# Patient Record
Sex: FEMALE | HISPANIC OR LATINO | Employment: FULL TIME | URBAN - METROPOLITAN AREA
[De-identification: names, ages, dates, MRNs, and addresses within clinical notes are randomized per-mention and may not be internally consistent; named-entity substitution may affect disease eponyms.]

---

## 2021-05-26 ENCOUNTER — RECORDS - HEALTHEAST (OUTPATIENT)
Dept: ADMINISTRATIVE | Facility: CLINIC | Age: 36
End: 2021-05-26

## 2021-05-30 ENCOUNTER — RECORDS - HEALTHEAST (OUTPATIENT)
Dept: ADMINISTRATIVE | Facility: CLINIC | Age: 36
End: 2021-05-30

## 2022-05-23 ENCOUNTER — HOSPITAL ENCOUNTER (EMERGENCY)
Facility: CLINIC | Age: 37
Discharge: HOME | End: 2022-05-23
Attending: EMERGENCY MEDICINE | Admitting: EMERGENCY MEDICINE
Payer: OTHER GOVERNMENT

## 2022-05-23 ENCOUNTER — APPOINTMENT (OUTPATIENT)
Dept: CT IMAGING | Facility: CLINIC | Age: 37
End: 2022-05-23
Attending: EMERGENCY MEDICINE
Payer: OTHER GOVERNMENT

## 2022-05-23 ENCOUNTER — APPOINTMENT (OUTPATIENT)
Dept: RADIOLOGY | Facility: CLINIC | Age: 37
End: 2022-05-23
Attending: EMERGENCY MEDICINE
Payer: OTHER GOVERNMENT

## 2022-05-23 VITALS
SYSTOLIC BLOOD PRESSURE: 148 MMHG | OXYGEN SATURATION: 100 % | DIASTOLIC BLOOD PRESSURE: 96 MMHG | RESPIRATION RATE: 16 BRPM | BODY MASS INDEX: 28.88 KG/M2 | HEART RATE: 76 BPM | HEIGHT: 63 IN | WEIGHT: 163 LBS | TEMPERATURE: 98.3 F

## 2022-05-23 DIAGNOSIS — G44.209 TENSION HEADACHE: ICD-10-CM

## 2022-05-23 DIAGNOSIS — R73.9 HYPERGLYCEMIA: ICD-10-CM

## 2022-05-23 DIAGNOSIS — R10.84 ABDOMINAL PAIN, GENERALIZED: ICD-10-CM

## 2022-05-23 DIAGNOSIS — R11.2 NAUSEA AND VOMITING, INTRACTABILITY OF VOMITING NOT SPECIFIED, UNSPECIFIED VOMITING TYPE: ICD-10-CM

## 2022-05-23 LAB
ALBUMIN SERPL-MCNC: 4.1 G/DL (ref 3.5–5)
ALBUMIN UR-MCNC: NEGATIVE MG/DL
ALP SERPL-CCNC: 96 U/L (ref 45–120)
ALT SERPL W P-5'-P-CCNC: 36 U/L (ref 0–45)
ANION GAP SERPL CALCULATED.3IONS-SCNC: 11 MMOL/L (ref 5–18)
APPEARANCE UR: CLEAR
AST SERPL W P-5'-P-CCNC: 17 U/L (ref 0–40)
BACTERIA #/AREA URNS HPF: ABNORMAL /HPF
BASOPHILS # BLD AUTO: 0 10E3/UL (ref 0–0.2)
BASOPHILS NFR BLD AUTO: 0 %
BILIRUB DIRECT SERPL-MCNC: 0.4 MG/DL
BILIRUB SERPL-MCNC: 1.5 MG/DL (ref 0–1)
BILIRUB UR QL STRIP: NEGATIVE
BUN SERPL-MCNC: 11 MG/DL (ref 8–22)
CALCIUM SERPL-MCNC: 9.9 MG/DL (ref 8.5–10.5)
CHLORIDE BLD-SCNC: 105 MMOL/L (ref 98–107)
CO2 SERPL-SCNC: 24 MMOL/L (ref 22–31)
COLOR UR AUTO: ABNORMAL
CREAT SERPL-MCNC: 0.79 MG/DL (ref 0.6–1.1)
EOSINOPHIL # BLD AUTO: 0.1 10E3/UL (ref 0–0.7)
EOSINOPHIL NFR BLD AUTO: 1 %
ERYTHROCYTE [DISTWIDTH] IN BLOOD BY AUTOMATED COUNT: 13.9 % (ref 10–15)
GFR SERPL CREATININE-BSD FRML MDRD: >90 ML/MIN/1.73M2
GLUCOSE BLD-MCNC: 197 MG/DL (ref 70–125)
GLUCOSE BLDC GLUCOMTR-MCNC: 219 MG/DL (ref 70–99)
GLUCOSE UR STRIP-MCNC: 500 MG/DL
HCT VFR BLD AUTO: 41.7 % (ref 35–47)
HGB BLD-MCNC: 13.8 G/DL (ref 11.7–15.7)
HGB UR QL STRIP: NEGATIVE
HOLD SPECIMEN: NORMAL
HYALINE CASTS: 1 /LPF
IMM GRANULOCYTES # BLD: 0 10E3/UL
IMM GRANULOCYTES NFR BLD: 0 %
KETONES UR STRIP-MCNC: NEGATIVE MG/DL
LEUKOCYTE ESTERASE UR QL STRIP: ABNORMAL
LIPASE SERPL-CCNC: 33 U/L (ref 0–52)
LYMPHOCYTES # BLD AUTO: 3.1 10E3/UL (ref 0.8–5.3)
LYMPHOCYTES NFR BLD AUTO: 34 %
MCH RBC QN AUTO: 30.2 PG (ref 26.5–33)
MCHC RBC AUTO-ENTMCNC: 33.1 G/DL (ref 31.5–36.5)
MCV RBC AUTO: 91 FL (ref 78–100)
MONOCYTES # BLD AUTO: 0.5 10E3/UL (ref 0–1.3)
MONOCYTES NFR BLD AUTO: 6 %
MUCOUS THREADS #/AREA URNS LPF: PRESENT /LPF
NEUTROPHILS # BLD AUTO: 5.2 10E3/UL (ref 1.6–8.3)
NEUTROPHILS NFR BLD AUTO: 59 %
NITRATE UR QL: NEGATIVE
NRBC # BLD AUTO: 0 10E3/UL
NRBC BLD AUTO-RTO: 0 /100
PH UR STRIP: 5.5 [PH] (ref 5–7)
PLATELET # BLD AUTO: 345 10E3/UL (ref 150–450)
POTASSIUM BLD-SCNC: 4 MMOL/L (ref 3.5–5)
PROT SERPL-MCNC: 7.8 G/DL (ref 6–8)
RADIOLOGIST FLAGS: ABNORMAL
RBC # BLD AUTO: 4.57 10E6/UL (ref 3.8–5.2)
RBC URINE: 1 /HPF
SODIUM SERPL-SCNC: 140 MMOL/L (ref 136–145)
SP GR UR STRIP: 1.04 (ref 1–1.03)
SQUAMOUS EPITHELIAL: 5 /HPF
UROBILINOGEN UR STRIP-MCNC: <2 MG/DL
WBC # BLD AUTO: 9 10E3/UL (ref 4–11)
WBC URINE: 3 /HPF

## 2022-05-23 PROCEDURE — 80053 COMPREHEN METABOLIC PANEL: CPT | Performed by: EMERGENCY MEDICINE

## 2022-05-23 PROCEDURE — 258N000003 HC RX IP 258 OP 636: Performed by: EMERGENCY MEDICINE

## 2022-05-23 PROCEDURE — 250N000009 HC RX 250: Performed by: EMERGENCY MEDICINE

## 2022-05-23 PROCEDURE — 99285 EMERGENCY DEPT VISIT HI MDM: CPT | Mod: 25

## 2022-05-23 PROCEDURE — 36415 COLL VENOUS BLD VENIPUNCTURE: CPT | Performed by: EMERGENCY MEDICINE

## 2022-05-23 PROCEDURE — 96374 THER/PROPH/DIAG INJ IV PUSH: CPT | Mod: 59

## 2022-05-23 PROCEDURE — 250N000013 HC RX MED GY IP 250 OP 250 PS 637: Performed by: EMERGENCY MEDICINE

## 2022-05-23 PROCEDURE — 96361 HYDRATE IV INFUSION ADD-ON: CPT

## 2022-05-23 PROCEDURE — 74177 CT ABD & PELVIS W/CONTRAST: CPT

## 2022-05-23 PROCEDURE — 93005 ELECTROCARDIOGRAM TRACING: CPT | Performed by: EMERGENCY MEDICINE

## 2022-05-23 PROCEDURE — 81001 URINALYSIS AUTO W/SCOPE: CPT | Performed by: EMERGENCY MEDICINE

## 2022-05-23 PROCEDURE — 250N000011 HC RX IP 250 OP 636: Performed by: EMERGENCY MEDICINE

## 2022-05-23 PROCEDURE — 82248 BILIRUBIN DIRECT: CPT | Performed by: EMERGENCY MEDICINE

## 2022-05-23 PROCEDURE — 83690 ASSAY OF LIPASE: CPT | Performed by: EMERGENCY MEDICINE

## 2022-05-23 PROCEDURE — 71046 X-RAY EXAM CHEST 2 VIEWS: CPT

## 2022-05-23 PROCEDURE — 85025 COMPLETE CBC W/AUTO DIFF WBC: CPT | Performed by: EMERGENCY MEDICINE

## 2022-05-23 RX ORDER — KETOROLAC TROMETHAMINE 15 MG/ML
15 INJECTION, SOLUTION INTRAMUSCULAR; INTRAVENOUS ONCE
Status: COMPLETED | OUTPATIENT
Start: 2022-05-23 | End: 2022-05-23

## 2022-05-23 RX ORDER — SUCRALFATE ORAL 1 G/10ML
1 SUSPENSION ORAL ONCE
Status: COMPLETED | OUTPATIENT
Start: 2022-05-23 | End: 2022-05-23

## 2022-05-23 RX ORDER — IOPAMIDOL 755 MG/ML
100 INJECTION, SOLUTION INTRAVASCULAR ONCE
Status: COMPLETED | OUTPATIENT
Start: 2022-05-23 | End: 2022-05-23

## 2022-05-23 RX ADMIN — SODIUM CHLORIDE 1000 ML: 9 INJECTION, SOLUTION INTRAVENOUS at 13:33

## 2022-05-23 RX ADMIN — ALUMINUM HYDROXIDE, MAGNESIUM HYDROXIDE, AND SIMETHICONE 30 ML: 200; 200; 20 SUSPENSION ORAL at 14:14

## 2022-05-23 RX ADMIN — SUCRALFATE 1 G: 1 SUSPENSION ORAL at 15:20

## 2022-05-23 RX ADMIN — IOPAMIDOL 100 ML: 755 INJECTION, SOLUTION INTRAVENOUS at 14:59

## 2022-05-23 RX ADMIN — KETOROLAC TROMETHAMINE 15 MG: 15 INJECTION, SOLUTION INTRAMUSCULAR; INTRAVENOUS at 14:02

## 2022-05-23 NOTE — ED TRIAGE NOTES
Pt presents w/ c/o hyperglycemia. Pt reports  PTA. Pt is DM2, BG in triage 219. Pt reports nausea, lightheaded, headache and dizziness. Pt also reports bilateral arm pain that started today. Pt has been taking medications for DM2. ABCs intact.      Triage Assessment     Row Name 05/23/22 1130       Triage Assessment (Adult)    Airway WDL WDL       Respiratory WDL    Respiratory WDL WDL       Skin Circulation/Temperature WDL    Skin Circulation/Temperature WDL WDL       Cardiac WDL    Cardiac WDL WDL       Peripheral/Neurovascular WDL    Peripheral Neurovascular WDL WDL       Cognitive/Neuro/Behavioral WDL    Cognitive/Neuro/Behavioral WDL WDL

## 2022-05-23 NOTE — ED NOTES
EMERGENCY DEPARTMENT SIGN OUT NOTE        ED COURSE AND MEDICAL DECISION MAKING  Patient was signed out to me by Dr Kalyan Lopez at end of shift    In brief, Erica Singleton is a 37 year old female who was evaluated and treated for elevated glucose    At time of sign out, CT of the abdomen was pending    CT of the abdomen did not reveal any acute findings to explain her discomfort however there was a right kidney cyst.  Due to the nature of the cyst cancer is on the differential.  I have clearly advised the patient to follow-up with primary care for an MRI        FINAL IMPRESSION    1. Hyperglycemia    2. Nausea and vomiting, intractability of vomiting not specified, unspecified vomiting type    3. Tension headache    4. Abdominal pain, generalized        ED MEDS  Medications   acetaminophen-caffeine (EXCEDRIN TENSION HEADACHE) 500-65 MG tablet 2 tablet (has no administration in time range)   0.9% sodium chloride BOLUS (0 mLs Intravenous Stopped 5/23/22 1423)   ketorolac (TORADOL) injection 15 mg (15 mg Intravenous Given 5/23/22 1402)   lidocaine (viscous) (XYLOCAINE) 2 % 15 mL, alum & mag hydroxide-simethicone (MAALOX) 15 mL GI Cocktail (30 mLs Oral Given 5/23/22 1414)   sucralfate (CARAFATE) suspension 1 g (1 g Oral Given 5/23/22 1520)   iopamidol (ISOVUE-370) solution 100 mL (100 mLs Intravenous Given 5/23/22 1459)       LAB  Labs Ordered and Resulted from Time of ED Arrival to Time of ED Departure   GLUCOSE BY METER - Abnormal       Result Value    GLUCOSE BY METER POCT 219 (*)    BASIC METABOLIC PANEL - Abnormal    Sodium 140      Potassium 4.0      Chloride 105      Carbon Dioxide (CO2) 24      Anion Gap 11      Urea Nitrogen 11      Creatinine 0.79      Calcium 9.9      Glucose 197 (*)     GFR Estimate >90     HEPATIC FUNCTION PANEL - Abnormal    Bilirubin Total 1.5 (*)     Bilirubin Direct 0.4      Protein Total 7.8      Albumin 4.1      Alkaline Phosphatase 96      AST 17      ALT 36     ROUTINE UA WITH  MICROSCOPIC REFLEX TO CULTURE - Abnormal    Color Urine Light Yellow      Appearance Urine Clear      Glucose Urine 500  (*)     Bilirubin Urine Negative      Ketones Urine Negative      Specific Gravity Urine 1.036 (*)     Blood Urine Negative      pH Urine 5.5      Protein Albumin Urine Negative      Urobilinogen Urine <2.0      Nitrite Urine Negative      Leukocyte Esterase Urine 75 Ken/uL (*)     Bacteria Urine Few (*)     Mucus Urine Present (*)     RBC Urine 1      WBC Urine 3      Squamous Epithelials Urine 5 (*)     Hyaline Casts Urine 1     LIPASE - Normal    Lipase 33     CBC WITH PLATELETS AND DIFFERENTIAL    WBC Count 9.0      RBC Count 4.57      Hemoglobin 13.8      Hematocrit 41.7      MCV 91      MCH 30.2      MCHC 33.1      RDW 13.9      Platelet Count 345      % Neutrophils 59      % Lymphocytes 34      % Monocytes 6      % Eosinophils 1      % Basophils 0      % Immature Granulocytes 0      NRBCs per 100 WBC 0      Absolute Neutrophils 5.2      Absolute Lymphocytes 3.1      Absolute Monocytes 0.5      Absolute Eosinophils 0.1      Absolute Basophils 0.0      Absolute Immature Granulocytes 0.0      Absolute NRBCs 0.0           RADIOLOGY    CT Abdomen Pelvis w Contrast   Final Result   Abnormal   IMPRESSION:    1.  No findings to explain the patient's symptoms. No evidence of an inflammatory process, bowel obstruction or hydronephrosis.   2.  Complex exophytic upper right renal cystic lesion. A cystic renal cell carcinoma is a differential consideration. Recommend nonemergent further evaluation with renal MRI.               [Critical Result: Complex right renal cystic lesion for which further evaluation with MRI is recommended.]      Finding was identified on 5/23/2022 3:17 PM.       Dr. Martinez was contacted by me on 5/23/2022 3:24 PM and verbalized understanding of the critical result.       Chest XR,  PA & LAT   Final Result   IMPRESSION: Negative chest.          DISCHARGE MEDS  New Prescriptions     No medications on file       Kelechi Martinez MD  [facility] EMERGENCY ROOM  [facility] [facility] [address] [phone]     Kelechi Martinez MD  05/23/22 8229

## 2022-05-23 NOTE — DISCHARGE INSTRUCTIONS
You were treated for elevated blood sugar.  CAT scan showed abnormal cyst of the right kidney.  The cyst needs further outpatient evaluation to rule out any serious cause.  I recommended MRI.  Follow-up with your primary care doctor within 1 week to recheck your blood sugar and order outpatient follow-up for the right kidney cyst

## 2022-05-23 NOTE — ED PROVIDER NOTES
"EMERGENCY DEPARTMENT ENCOUNTER      NAME: Erica Singleton  AGE: 37 year old female  YOB: 1985  MRN: [id number]  EVALUATION DATE & TIME: 2022 12:56 PM    PCP: No primary care provider on file.    ED PROVIDER: Kalyan Lopez D.O.      Chief Complaint   Patient presents with     Hyperglycemia       FINAL IMPRESSION:  1. Hyperglycemia    2. Nausea and vomiting, intractability of vomiting not specified, unspecified vomiting type    3. Tension headache    4. Abdominal pain, generalized        ED COURSE & MEDICAL DECISION MAKIN:04 PM I met with the patient to gather history and to perform my initial exam. I discussed the plan for care while in the Emergency Department.  2:29 PM I signed out the patient to Dr. Martinez. Awaiting CT.    ED Course as of 22 0649   Mon May 23, 2022   1411 Reevaluated the patient.  Patient's abdominal pain appears more generalized at this time.  She also reports that she started to get a \"full\" sensation in her chest after the IV fluids were started but does not have any significant shortness of breath.  She also reported some persistent headache, and Toradol has been ordered for this.  GI cocktail has been ordered for the chest discomfort as it does sound like potential GERD.  Additionally will obtain an EKG and a CT scan of the abdomen.   1438 Patient care turned over to Dr. Martinez       Pertinent Labs & Imaging studies reviewed. (See chart for details)  37 year old female presents to the Emergency Department for evaluation of hyperglycemia.  Blood sugar in the emergency department was 197 on the formal lab testing.  Patient additionally did have some mild lightheadedness, and was found to have some abdominal pain on exam.  Lab testing did not show any evidence of DKA, was otherwise unremarkable.  However with the abdominal pain, did decide to obtain a CT scan to ensure there was no other acute pathology present at time of exam in the emergency department.  If CT " imaging is unremarkable I do believe the patient can likely be discharged home with outpatient follow-up with her primary care provider.  Patient care was turned over to Dr. Martinez for final disposition pending results of the CT.    At the conclusion of the encounter I discussed the results of all of the tests and the disposition. The questions were answered. The patient or family acknowledged understanding and was agreeable with the care plan.      HPI    Patient information was obtained from: Patient    Use of : N/A        Erica Singleton is a 37 year old female with a history of T2DM who presents with hyperglycemia.    Patient reports she presents with hyperglycemia, her blood sugar is 220. She endorses nausea and vomiting since Saturday (2 days ago), as well as lightheadedness and a pressure in her head that feels like her head is going to explode. Patient took 3 Zofran on Saturday that did not help. She endorses taking insulin. Endorses chronic neuropathy. Patient denies cough, congestion, chest pain, abdominal pain, shortness of breath, change in urine output, hematuria, dysuria, numbness, tingling, or any other current complaints.    Social: Denies tobacco or alcohol use.    REVIEW OF SYSTEMS  Constitutional:  Denies fever, chills, weight loss or weakness  Eyes:  No pain, discharge, redness  HENT:  Denies sore throat, ear pain, congestion  Respiratory: No SOB, wheeze or cough  Cardiovascular:  No CP, palpitations  GI:  Denies abdominal pain, diarrhea. Positive for nausea and vomiting,  : Denies dysuria, hematuria, change in urine output  Musculoskeletal:  Denies any new muscle/joint pain, swelling or loss of function.  Skin:  Denies rash, pallor  Neurologic:  Denies headache, focal weakness or sensory changes  Lymph: Denies swollen nodes    All other systems negative unless noted in HPI.    PAST MEDICAL HISTORY:  No past medical history on file.    PAST SURGICAL HISTORY:  No past surgical history  "on file.      CURRENT MEDICATIONS:    No current facility-administered medications for this encounter.     Current Outpatient Medications   Medication     Cyanocobalamin (B-12) 1000 MCG TBCR         ALLERGIES:  Allergies   Allergen Reactions     Amoxicillin Anaphylaxis       FAMILY HISTORY:  No family history on file.    SOCIAL HISTORY:  Social History     Socioeconomic History     Marital status:        VITALS:  Patient Vitals for the past 24 hrs:   BP Temp Temp src Pulse Resp SpO2 Height Weight   05/23/22 1129 (!) 146/94 98.3  F (36.8  C) Temporal 75 16 99 % 1.6 m (5' 3\") 73.9 kg (163 lb)       PHYSICAL EXAM    VITAL SIGNS: BP (!) 146/94   Pulse 75   Temp 98.3  F (36.8  C) (Temporal)   Resp 16   Ht 1.6 m (5' 3\")   Wt 73.9 kg (163 lb)   LMP 04/01/2022 (Within Months)   SpO2 99%   BMI 28.87 kg/m      General Appearance: Well-appearing, well-nourished, no acute distress   Head:  Normocephalic, without obvious abnormality, atraumatic  Eyes:  PERRL, conjunctiva/corneas clear, EOM's intact,  ENT:  Lips, mucosa, and tongue normal, membranes are moist without pallor  Neck:  Normal ROM, symmetrical, trachea midline    Cardio:  Regular rate and rhythm, no murmur, rub or gallop, 2+ pulses symmetric in all extremities  Pulm:  Clear to auscultation bilaterally, respirations unlabored,  Abdomen:  Soft, no rebound or guarding. Mild LLQ tenderness with palpation.  Musculoskeletal: Full ROM, no edema, no cyanosis, good ROM of major joints  Integument:  Warm, Dry, No erythema, No rash.    Neurologic:  Alert & oriented.  No focal deficits appreciated.  Ambulatory.  Psychiatric:  Affect normal, Judgment normal, Mood normal.      LABS  Results for orders placed or performed during the hospital encounter of 05/23/22 (from the past 24 hour(s))   Glucose by meter   Result Value Ref Range    GLUCOSE BY METER POCT 219 (H) 70 - 99 mg/dL   CBC with platelets + differential    Narrative    The following orders were created for " panel order CBC with platelets + differential.  Procedure                               Abnormality         Status                     ---------                               -----------         ------                     CBC with platelets and d...[[id number]]                      Final result                 Please view results for these tests on the individual orders.   Basic metabolic panel   Result Value Ref Range    Sodium 140 136 - 145 mmol/L    Potassium 4.0 3.5 - 5.0 mmol/L    Chloride 105 98 - 107 mmol/L    Carbon Dioxide (CO2) 24 22 - 31 mmol/L    Anion Gap 11 5 - 18 mmol/L    Urea Nitrogen 11 8 - 22 mg/dL    Creatinine 0.79 0.60 - 1.10 mg/dL    Calcium 9.9 8.5 - 10.5 mg/dL    Glucose 197 (H) 70 - 125 mg/dL    GFR Estimate >90 >60 mL/min/1.73m2   Hepatic function panel   Result Value Ref Range    Bilirubin Total 1.5 (H) 0.0 - 1.0 mg/dL    Bilirubin Direct 0.4 <=0.5 mg/dL    Protein Total 7.8 6.0 - 8.0 g/dL    Albumin 4.1 3.5 - 5.0 g/dL    Alkaline Phosphatase 96 45 - 120 U/L    AST 17 0 - 40 U/L    ALT 36 0 - 45 U/L   Lipase   Result Value Ref Range    Lipase 33 0 - 52 U/L   [place] Draw    Narrative    The following orders were created for panel order [place] Draw.  Procedure                               Abnormality         Status                     ---------                               -----------         ------                     Extra Red Top Tube[[id number]]                               In process                 Extra Green Top (Lithium...[[id number]]                      In process                 Extra Purple Top Tube[[id number]]                            In process                   Please view results for these tests on the individual orders.   CBC with platelets and differential   Result Value Ref Range    WBC Count 9.0 4.0 - 11.0 10e3/uL    RBC Count 4.57 3.80 - 5.20 10e6/uL    Hemoglobin 13.8 11.7 - 15.7 g/dL    Hematocrit 41.7 35.0 - 47.0 %    MCV 91 78 - 100 fL    MCH 30.2 26.5 - 33.0 pg     MCHC 33.1 31.5 - 36.5 g/dL    RDW 13.9 10.0 - 15.0 %    Platelet Count 345 150 - 450 10e3/uL    % Neutrophils 59 %    % Lymphocytes 34 %    % Monocytes 6 %    % Eosinophils 1 %    % Basophils 0 %    % Immature Granulocytes 0 %    NRBCs per 100 WBC 0 <1 /100    Absolute Neutrophils 5.2 1.6 - 8.3 10e3/uL    Absolute Lymphocytes 3.1 0.8 - 5.3 10e3/uL    Absolute Monocytes 0.5 0.0 - 1.3 10e3/uL    Absolute Eosinophils 0.1 0.0 - 0.7 10e3/uL    Absolute Basophils 0.0 0.0 - 0.2 10e3/uL    Absolute Immature Granulocytes 0.0 <=0.4 10e3/uL    Absolute NRBCs 0.0 10e3/uL         RADIOLOGY  Chest XR,  PA & LAT    (Results Pending)   CT Abdomen Pelvis w Contrast    (Results Pending)          EKG:    Rate: 71bpm  Rhythm: Normal Sinus Rhythm  Axis: Normal  Interval: Normal  Conduction: Normal  QRS: Normal  ST: Normal  T-wave: Normal  QT: Not prolonged  Comparison EKG: no previous available for comparison  Impression:  No acute ischemic change   I have independently reviewed and interpreted today's EKG, pending Cardiologist read        MEDICATIONS GIVEN IN THE EMERGENCY:  Medications   0.9% sodium chloride BOLUS (0 mLs Intravenous Stopped 5/23/22 1423)   ketorolac (TORADOL) injection 15 mg (15 mg Intravenous Given 5/23/22 1402)   lidocaine (viscous) (XYLOCAINE) 2 % 15 mL, alum & mag hydroxide-simethicone (MAALOX) 15 mL GI Cocktail (30 mLs Oral Given 5/23/22 1414)       NEW PRESCRIPTIONS STARTED AT TODAY'S ER VISIT  New Prescriptions    No medications on file        Kalyan Lopez D.O.  Emergency Medicine  [facility] EMERGENCY ROOM  [address] [address] [phone]  Dept: [phone]     Kalyan Lopez DO  05/24/22 9164

## 2022-06-02 ENCOUNTER — OFFICE VISIT (OUTPATIENT)
Dept: FAMILY MEDICINE | Facility: CLINIC | Age: 37
End: 2022-06-02
Payer: OTHER GOVERNMENT

## 2022-06-02 VITALS
HEART RATE: 65 BPM | SYSTOLIC BLOOD PRESSURE: 126 MMHG | WEIGHT: 165.4 LBS | BODY MASS INDEX: 29.3 KG/M2 | DIASTOLIC BLOOD PRESSURE: 86 MMHG | HEIGHT: 63 IN | OXYGEN SATURATION: 98 % | RESPIRATION RATE: 16 BRPM

## 2022-06-02 DIAGNOSIS — N28.1 COMPLEX RENAL CYST: ICD-10-CM

## 2022-06-02 DIAGNOSIS — R11.0 NAUSEA: ICD-10-CM

## 2022-06-02 DIAGNOSIS — E11.65 TYPE 2 DIABETES MELLITUS WITH HYPERGLYCEMIA, WITHOUT LONG-TERM CURRENT USE OF INSULIN (H): Primary | ICD-10-CM

## 2022-06-02 PROBLEM — E53.8 VITAMIN B12 DEFICIENCY: Status: ACTIVE | Noted: 2021-12-09

## 2022-06-02 PROBLEM — E66.811 OBESITY (BMI 30.0-34.9): Status: RESOLVED | Noted: 2019-02-27 | Resolved: 2022-06-02

## 2022-06-02 PROBLEM — R05.9 COUGH: Status: ACTIVE | Noted: 2021-07-19

## 2022-06-02 PROBLEM — G89.29 CHRONIC RIGHT SHOULDER PAIN: Status: ACTIVE | Noted: 2021-07-19

## 2022-06-02 PROBLEM — M25.511 CHRONIC RIGHT SHOULDER PAIN: Status: ACTIVE | Noted: 2021-07-19

## 2022-06-02 PROBLEM — E78.5 DYSLIPIDEMIA: Status: ACTIVE | Noted: 2021-06-11

## 2022-06-02 PROBLEM — E66.811 OBESITY (BMI 30.0-34.9): Status: ACTIVE | Noted: 2019-02-27

## 2022-06-02 PROCEDURE — 99204 OFFICE O/P NEW MOD 45 MIN: CPT | Performed by: FAMILY MEDICINE

## 2022-06-02 RX ORDER — SEMAGLUTIDE 1.34 MG/ML
INJECTION, SOLUTION SUBCUTANEOUS
Status: CANCELLED | OUTPATIENT
Start: 2022-06-02

## 2022-06-02 RX ORDER — SEMAGLUTIDE 1.34 MG/ML
1 INJECTION, SOLUTION SUBCUTANEOUS WEEKLY
Qty: 9 ML | Refills: 3 | Status: SHIPPED | OUTPATIENT
Start: 2022-06-02

## 2022-06-02 RX ORDER — ALBUTEROL SULFATE 90 UG/1
AEROSOL, METERED RESPIRATORY (INHALATION)
COMMUNITY
Start: 2022-04-07

## 2022-06-02 RX ORDER — ONDANSETRON 4 MG/1
TABLET, ORALLY DISINTEGRATING ORAL
COMMUNITY
Start: 2021-06-09

## 2022-06-02 RX ORDER — SEMAGLUTIDE 1.34 MG/ML
INJECTION, SOLUTION SUBCUTANEOUS
COMMUNITY
Start: 2022-04-20

## 2022-06-02 RX ORDER — BLOOD-GLUCOSE METER
KIT MISCELLANEOUS
COMMUNITY
Start: 2021-12-09

## 2022-06-02 NOTE — PROGRESS NOTES
"  Assessment & Plan     (E11.65) Type 2 diabetes mellitus with hyperglycemia, without long-term current use of insulin (H)  (primary encounter diagnosis)  Comment: a1c 7.0 at 12/2021. DM fair control. Will continue current plan . She will need a1c and urine microalbumine test. She likes to do the lab at next visit.   Plan: Semaglutide, 1 MG/DOSE, (OZEMPIC, 1 MG/DOSE,) 4        MG/3ML SOPN         Continue monitor bs at home. DM diet control and exercise addressed.     (R11.0) Nausea  Comment: improved some and zofran not much help. She is not pregnant. Unknown etiology. She had normal liver and kidney function test at er.    Plan: advise brat diet as tolerated. Rest and push fluid.     (N28.1) Complex renal cyst  Comment: accidentally finding per ct. No family history of kidney disease. Non smoker.  Kidney function is normal. No blood in urine.   Plan: MR Renal wo & w Contrast, Adult Urology         Referral        Will have renal mri and consult with urology.         BMI:   Estimated body mass index is 29.3 kg/m  as calculated from the following:    Height as of this encounter: 1.6 m (5' 3\").    Weight as of this encounter: 75 kg (165 lb 6.4 oz).    Return in about 2 weeks (around 6/16/2022).    Nancy Navarrete MD  [facility]    Subjective   April is a 37 year old who presents for the following health issues  accompanied by her friend.    HPI     1) pt was at er at 5/23 due to hyperglycemia and n/v. Reviewed the chart : abd ct found: Complex exophytic upper right renal cystic lesion. A cystic renal cell carcinoma is a differential consideration. Recommend nonemergent further evaluation with renal MRI.    2) DM she uses semaglutide 1 mg weekly.     3) nausea for one month. She has implant for bc, she does not get period.       Review of Systems   Constitutional, HEENT, cardiovascular, pulmonary, gi and gu systems are negative, except as otherwise noted.      Objective    /86 (BP " "Location: Right arm, Patient Position: Sitting, Cuff Size: Adult Regular)   Pulse 65   Resp 16   Ht 1.6 m (5' 3\")   Wt 75 kg (165 lb 6.4 oz)   SpO2 98%   BMI 29.30 kg/m    Body mass index is 29.3 kg/m .  Physical Exam   GENERAL: healthy, alert and no distress  NECK: no adenopathy, no asymmetry, masses, or scars and thyroid normal to palpation  RESP: lungs clear to auscultation - no rales, rhonchi or wheezes  CV: regular rate and rhythm, normal S1 S2, no S3 or S4, no murmur, click or rub, no peripheral edema and peripheral pulses strong  ABDOMEN: soft,  Mild tender at low abd, no hepatosplenomegaly, no masses and bowel sounds normal. Moderate tenderness on right CVA.   MS: no gross musculoskeletal defects noted, no edema     "

## 2022-06-08 ENCOUNTER — MYC MEDICAL ADVICE (OUTPATIENT)
Dept: FAMILY MEDICINE | Facility: CLINIC | Age: 37
End: 2022-06-08
Payer: OTHER GOVERNMENT

## 2022-06-08 DIAGNOSIS — N28.1 RENAL CYST: Primary | ICD-10-CM

## 2022-06-15 ENCOUNTER — HOSPITAL ENCOUNTER (OUTPATIENT)
Dept: MRI IMAGING | Facility: CLINIC | Age: 37
Discharge: HOME | End: 2022-06-15
Attending: FAMILY MEDICINE | Admitting: FAMILY MEDICINE
Payer: OTHER GOVERNMENT

## 2022-06-15 DIAGNOSIS — N28.1 COMPLEX RENAL CYST: ICD-10-CM

## 2022-06-15 PROCEDURE — A9585 GADOBUTROL INJECTION: HCPCS | Performed by: FAMILY MEDICINE

## 2022-06-15 PROCEDURE — 74183 MRI ABD W/O CNTR FLWD CNTR: CPT

## 2022-06-15 PROCEDURE — 255N000002 HC RX 255 OP 636: Performed by: FAMILY MEDICINE

## 2022-06-15 RX ORDER — GADOBUTROL 604.72 MG/ML
7 INJECTION INTRAVENOUS ONCE
Status: COMPLETED | OUTPATIENT
Start: 2022-06-15 | End: 2022-06-15

## 2022-06-15 RX ADMIN — GADOBUTROL 7 ML: 604.72 INJECTION INTRAVENOUS at 17:54

## 2022-06-16 ENCOUNTER — TELEPHONE (OUTPATIENT)
Dept: FAMILY MEDICINE | Facility: CLINIC | Age: 37
End: 2022-06-16
Payer: OTHER GOVERNMENT

## 2022-06-16 NOTE — TELEPHONE ENCOUNTER
Called pt and informed the renal mri result and she needs to follow-up with urologist. She  has appointment with urologist Dr. Gordo Rosado on 6/28/2022.      Nancy Navarrete MD on 6/16/2022 at 9:09 AM

## 2022-06-24 ENCOUNTER — HOSPITAL ENCOUNTER (EMERGENCY)
Facility: CLINIC | Age: 37
Discharge: HOME | End: 2022-06-24
Admitting: PHYSICIAN ASSISTANT
Payer: OTHER GOVERNMENT

## 2022-06-24 VITALS
HEIGHT: 63 IN | RESPIRATION RATE: 16 BRPM | SYSTOLIC BLOOD PRESSURE: 143 MMHG | BODY MASS INDEX: 28.88 KG/M2 | HEART RATE: 79 BPM | TEMPERATURE: 98.3 F | OXYGEN SATURATION: 98 % | DIASTOLIC BLOOD PRESSURE: 83 MMHG | WEIGHT: 163 LBS

## 2022-06-24 DIAGNOSIS — M54.9 BACK PAIN: ICD-10-CM

## 2022-06-24 DIAGNOSIS — C64.1 RENAL CELL CARCINOMA OF RIGHT KIDNEY METASTATIC TO OTHER SITE (H): ICD-10-CM

## 2022-06-24 PROCEDURE — 99285 EMERGENCY DEPT VISIT HI MDM: CPT

## 2022-06-24 PROCEDURE — 250N000011 HC RX IP 250 OP 636: Performed by: PHYSICIAN ASSISTANT

## 2022-06-24 PROCEDURE — 96372 THER/PROPH/DIAG INJ SC/IM: CPT | Performed by: PHYSICIAN ASSISTANT

## 2022-06-24 RX ORDER — MORPHINE SULFATE 10 MG/ML
8 INJECTION, SOLUTION INTRAMUSCULAR; INTRAVENOUS ONCE
Status: COMPLETED | OUTPATIENT
Start: 2022-06-24 | End: 2022-06-24

## 2022-06-24 RX ORDER — METOCLOPRAMIDE 5 MG/1
5 TABLET ORAL
Qty: 20 TABLET | Refills: 0 | Status: SHIPPED | OUTPATIENT
Start: 2022-06-24

## 2022-06-24 RX ORDER — OXYCODONE AND ACETAMINOPHEN 5; 325 MG/1; MG/1
1 TABLET ORAL EVERY 6 HOURS PRN
Qty: 12 TABLET | Refills: 0 | Status: SHIPPED | OUTPATIENT
Start: 2022-06-24

## 2022-06-24 RX ORDER — ONDANSETRON 4 MG/1
4 TABLET, ORALLY DISINTEGRATING ORAL ONCE
Status: COMPLETED | OUTPATIENT
Start: 2022-06-24 | End: 2022-06-24

## 2022-06-24 RX ORDER — MORPHINE SULFATE 4 MG/ML
8 INJECTION, SOLUTION INTRAMUSCULAR; INTRAVENOUS ONCE
Status: DISCONTINUED | OUTPATIENT
Start: 2022-06-24 | End: 2022-06-24

## 2022-06-24 RX ADMIN — MORPHINE SULFATE 8 MG: 10 INJECTION INTRAVENOUS at 17:57

## 2022-06-24 RX ADMIN — ONDANSETRON 4 MG: 4 TABLET, ORALLY DISINTEGRATING ORAL at 17:57

## 2022-06-24 ASSESSMENT — ENCOUNTER SYMPTOMS
NAUSEA: 1
FREQUENCY: 0
DYSURIA: 0
BACK PAIN: 1
WEAKNESS: 0
FEVER: 0
NUMBNESS: 0
CHILLS: 0
VOMITING: 1

## 2022-06-24 NOTE — ED TRIAGE NOTES
Arrives to ED with c/o back pain. Has mass to R kidney. Biopsy dx with renal cell carcinoma. Has tylenol #3 at home. Only able to take at HS d/t side effect of N/V. Reports has been alternating 1000mg tylenol and ibuprofen without relief. Pain 10/10 to entire back.      Triage Assessment     Row Name 06/24/22 2994       Triage Assessment (Adult)    Airway WDL WDL       Respiratory WDL    Respiratory WDL WDL       Skin Circulation/Temperature WDL    Skin Circulation/Temperature WDL WDL       Cardiac WDL    Cardiac WDL WDL       Peripheral/Neurovascular WDL    Peripheral Neurovascular WDL WDL       Cognitive/Neuro/Behavioral WDL    Cognitive/Neuro/Behavioral WDL WDL

## 2022-06-24 NOTE — Clinical Note
Erica Singleton was seen and treated in our emergency department on 6/24/2022.  She may return to work on 06/25/2022.       If you have any questions or concerns, please don't hesitate to call.      Nalini Das PA-C

## 2022-06-24 NOTE — DISCHARGE INSTRUCTIONS
As we discussed, please follow-up with urology as scheduled on Tuesday.  You can have a further discussion about work-up as well as pain control at that appointment.  In the meantime, I will send you home with a prescription for Percocet which is oxycodone with Tylenol.  This is a strong pain medication and can make you drowsy.  Please do not take this while working, driving, or operating heavy machinery.  You may not take this with the Tylenol 3 that you have at home.  I will also send you home with a prescription for Reglan for your nausea you can try instead of Zofran.  If at anytime you develop any new or worsening pain, numbness in your groin, loss of control of bladder or bowel, weakness or numbness in your legs, or any new or concerning symptoms please return to the ER for further evaluation.

## 2022-06-24 NOTE — ED PROVIDER NOTES
Emergency Department Encounter   NAME: Erica Singleton ; AGE: 37 year old female ; YOB: 1985 ; MRN: [id number] ; PCP: System, Provider Not In   ED PROVIDER: Nalini Das PA-C    Evaluation Date & Time:   6/24/2022  4:37 PM    CHIEF COMPLAINT:  Back Pain      Impression and Plan   MDM: Erica Singleton is a 37 year old female with a pertinent history of dyslipidemia, GERD, type 2 diabetes, vitamin B12 deficiency, who presents to the ED by private vehicle for evaluation of pain.  The patient has had ongoing pain to her low back over the past 3 weeks, during which she has had an outpatient work-up and diagnosed with renal cell carcinoma by an MRI through her primary care clinic.  She will finally be able to see urology at her scheduled appointment on Tuesday (in 4 days).  She has a prescription for Tylenol with codeine at home, however it does not help her pain and makes her nauseated despite using Zofran.  She presented to the ER today for pain control.  Unfortunately, patient had a very long wait in triage until she was able to go to her room for evaluation.  She is vitally stable, well-appearing, and in no acute distress.  She does have some mild tenderness to the abdomen that is generalized as well as diffuse tenderness along her lower back including her bilateral CVAs, paraspinal musculature, and midline spine which I suspect is due to her known metastatic cancer.  Tenderness is uniform across the back and there is no localized areas of increased tenderness.  No palpable bony step-offs of the spine or neurologic deficits.  Low suspicion for metastatic spinal lesion, no evidence of cauda equina syndrome.  Offered patient CT of her spine to further evaluate, though she ultimately declined.  Feel this is reasonable as patient will likely have further work-up and PET scan with urology in 4 days.  No fevers or infectious symptoms.  No concern at this time for UTI or pyelonephritis.  Her symptoms are  consistent with ongoing cancer pain and she would like something stronger to treat this.  I ordered IM morphine and Zofran for her pain here in the ER, we will change her Tylenol 3 to Percocet at home and I will provide her with a prescription for Reglan.  We reviewed medication side effects, importance of following up with urology as scheduled, and concerning signs and symptoms to return to the ED.  She verbalized understanding is comfortable with the plan.  Discharged home in good condition.    ED COURSE:  5:22 PM I met and introduced myself to the patient. I gathered initial history and performed my physical exam.  We discussed prescriptions, discharge, follow-up, and reasons to return to the emergency department.      At the conclusion of the encounter I discussed the results of all the tests and the disposition. The questions were answered. The patient or family acknowledged understanding and was agreeable with the care plan.    FINAL IMPRESSION:    ICD-10-CM    1. Renal cell carcinoma of right kidney metastatic to other site (H)  C64.1    2. Back pain  M54.9          MEDICATIONS GIVEN IN THE EMERGENCY DEPARTMENT:  Medications   ondansetron (ZOFRAN ODT) ODT tab 4 mg (4 mg Oral Given 6/24/22 1757)   Morphine Sulfate (PF) injection 8 mg (8 mg Intramuscular Given 6/24/22 1757)         NEW PRESCRIPTIONS STARTED AT TODAY'S ED VISIT:  New Prescriptions    METOCLOPRAMIDE (REGLAN) 5 MG TABLET    Take 1 tablet (5 mg) by mouth 4 times daily (before meals and nightly)    OXYCODONE-ACETAMINOPHEN (PERCOCET) 5-325 MG TABLET    Take 1 tablet by mouth every 6 hours as needed for pain         HPI   Patient information was obtained from: Patient    Use of Intrepreter: N/A    Erica Singleton is a 37 year old female with a pertinent history of dyslipidemia, GERD, type 2 diabetes, vitamin B12 deficiency, who presents to the ED by private vehicle for evaluation of pain.     Patient was seen in our emergency department at the end of  May at which time she was found to have an incidental mass on her right kidney.  She followed up with her primary care provider and was referred for an MRI, and she reports that the mass on her right kidney was consistent with renal cell carcinoma that there was also a spot on her left kidney and on her pancreas.  She has scheduled follow-up with urology on Tuesday (~4 days).  When she had her MRI performed, she received a prescription for Tylenol with Codeine, and has been using them though with minimal relief of her pain.  They are also causing her to feel quite nauseated and she has had several episodes of vomiting.  She does have a prescription for Zofran at home though this is not really improving her nausea.  Her pain is across her entire lower back.  She has not had any other associated symptoms.  No fevers or chills, no urinary symptoms, no numbness in her groin, loss of control of bladder or bowel, weakness or numbness in her legs.  She is here today for pain control until she sees urology.    REVIEW OF SYSTEMS:  Review of Systems   Constitutional: Negative for chills and fever.   Gastrointestinal: Positive for nausea and vomiting (pain medicine induced).   Genitourinary: Negative for dysuria, frequency and urgency.   Musculoskeletal: Positive for back pain (ongoing).   Neurological: Negative for weakness and numbness.        No loss of bladder or bowel control.   All other systems reviewed and are negative.        Medical History     No past medical history on file.    No past surgical history on file.    No family history on file.    Social History     Tobacco Use     Smoking status: Never Smoker     Smokeless tobacco: Never Used   Substance Use Topics     Alcohol use: Not Currently     Drug use: Never       metoclopramide (REGLAN) 5 MG tablet  oxyCODONE-acetaminophen (PERCOCET) 5-325 MG tablet  acetaminophen-codeine (TYLENOL #3) 300-30 MG tablet  albuterol (PROAIR HFA/PROVENTIL HFA/VENTOLIN HFA) 108 (90  "Base) MCG/ACT inhaler  blood glucose (FREESTYLE LITE) test strip  Cyanocobalamin (B-12) 1000 MCG TBCR  ondansetron (ZOFRAN ODT) 4 MG ODT tab  OZEMPIC, 1 MG/DOSE, 4 MG/3ML SOPN  Semaglutide, 1 MG/DOSE, (OZEMPIC, 1 MG/DOSE,) 4 MG/3ML SOPN          Physical Exam     First Vitals:  Patient Vitals for the past 24 hrs:   BP Temp Temp src Pulse Resp SpO2 Height Weight   06/24/22 1812 (!) 143/83 -- -- 79 16 98 % -- --   06/24/22 1337 (!) 145/90 98.3  F (36.8  C) Temporal 79 16 99 % 1.6 m (5' 3\") 73.9 kg (163 lb)         PHYSICAL EXAM:   Physical Exam  Vitals and nursing note reviewed.   Constitutional:       General: She is not in acute distress.     Appearance: Normal appearance. She is not ill-appearing or toxic-appearing.   HENT:      Head: Normocephalic and atraumatic.   Eyes:      Conjunctiva/sclera: Conjunctivae normal.   Cardiovascular:      Rate and Rhythm: Normal rate and regular rhythm.      Heart sounds: Normal heart sounds.   Pulmonary:      Effort: Pulmonary effort is normal.      Breath sounds: Normal breath sounds.   Abdominal:      General: Abdomen is flat. Bowel sounds are normal. There is no distension.      Palpations: Abdomen is soft.      Tenderness: There is abdominal tenderness (mild). There is right CVA tenderness and left CVA tenderness. There is no guarding or rebound.   Musculoskeletal:      Comments: Tenderness across her entire lower back, midline, flank, and paraspinal musculature. No bony step offs. No rashes or lesions. Gait intact.  5-5 strength with dorsiflexion and plantarflexion, knee flexion extension, hip flexion.  Sensation intact to extremities.    Neurological:      Mental Status: She is alert.         Results     LAB:  All pertinent labs reviewed and interpreted  Labs Ordered and Resulted from Time of ED Arrival to Time of ED Departure - No data to display    RADIOLOGY:  No orders to display       Nalini Das PA-C   Emergency Medicine   [facility] " EMERGENCY ROOM       Nalini Das PA-C  06/24/22 1434

## 2022-06-28 ENCOUNTER — MYC MEDICAL ADVICE (OUTPATIENT)
Dept: FAMILY MEDICINE | Facility: CLINIC | Age: 37
End: 2022-06-28

## 2022-06-28 ENCOUNTER — OFFICE VISIT (OUTPATIENT)
Dept: ONCOLOGY | Facility: CLINIC | Age: 37
End: 2022-06-28
Attending: STUDENT IN AN ORGANIZED HEALTH CARE EDUCATION/TRAINING PROGRAM
Payer: OTHER GOVERNMENT

## 2022-06-28 VITALS
RESPIRATION RATE: 18 BRPM | OXYGEN SATURATION: 99 % | TEMPERATURE: 98.5 F | HEART RATE: 70 BPM | SYSTOLIC BLOOD PRESSURE: 156 MMHG | HEIGHT: 63 IN | WEIGHT: 165.3 LBS | DIASTOLIC BLOOD PRESSURE: 95 MMHG | BODY MASS INDEX: 29.29 KG/M2

## 2022-06-28 DIAGNOSIS — C64.1 RENAL MALIGNANT TUMOR, RIGHT (H): Primary | ICD-10-CM

## 2022-06-28 DIAGNOSIS — E11.65 TYPE 2 DIABETES MELLITUS WITH HYPERGLYCEMIA, WITHOUT LONG-TERM CURRENT USE OF INSULIN (H): ICD-10-CM

## 2022-06-28 PROCEDURE — 99204 OFFICE O/P NEW MOD 45 MIN: CPT | Performed by: STUDENT IN AN ORGANIZED HEALTH CARE EDUCATION/TRAINING PROGRAM

## 2022-06-28 PROCEDURE — G0463 HOSPITAL OUTPT CLINIC VISIT: HCPCS

## 2022-06-28 RX ORDER — CLINDAMYCIN PHOSPHATE 900 MG/50ML
900 INJECTION, SOLUTION INTRAVENOUS
Status: CANCELLED | OUTPATIENT
Start: 2022-06-28

## 2022-06-28 RX ORDER — KETOROLAC TROMETHAMINE 10 MG/1
10 TABLET, FILM COATED ORAL EVERY 6 HOURS PRN
Qty: 20 TABLET | Refills: 0 | Status: SHIPPED | OUTPATIENT
Start: 2022-06-28

## 2022-06-28 RX ORDER — CLINDAMYCIN PHOSPHATE 900 MG/50ML
900 INJECTION, SOLUTION INTRAVENOUS SEE ADMIN INSTRUCTIONS
Status: CANCELLED | OUTPATIENT
Start: 2022-06-28

## 2022-06-28 RX ORDER — HEPARIN SODIUM 5000 [USP'U]/.5ML
5000 INJECTION, SOLUTION INTRAVENOUS; SUBCUTANEOUS
Status: CANCELLED | OUTPATIENT
Start: 2022-06-28

## 2022-06-28 ASSESSMENT — PAIN SCALES - GENERAL: PAINLEVEL: EXTREME PAIN (8)

## 2022-06-28 NOTE — PROGRESS NOTES
"Oncology Rooming Note    June 28, 2022 12:52 PM   Erica Singleton is a 37 year old female who presents for:    Chief Complaint   Patient presents with     Urology     Initial Vitals: BP (!) 156/95   Pulse 70   Temp 98.5  F (36.9  C)   Resp 18   Ht 1.6 m (5' 3\")   Wt 75 kg (165 lb 4.8 oz)   SpO2 99%   BMI 29.28 kg/m   Estimated body mass index is 29.28 kg/m  as calculated from the following:    Height as of this encounter: 1.6 m (5' 3\").    Weight as of this encounter: 75 kg (165 lb 4.8 oz). Body surface area is 1.83 meters squared.  Extreme Pain (8) Comment: Data Unavailable   No LMP recorded. Patient has had an implant.  Allergies reviewed: Yes  Medications reviewed: Yes    Medications: Medication refills not needed today.  Pharmacy name entered into [employer]: [id number] [place] [address] AT [facility]    Clinical concerns: None       Kari Uribe LPN            "

## 2022-06-28 NOTE — LETTER
"    6/28/2022         RE: Erica Singleton  [address] [facility] [address]        Dear Colleague,    Thank you for referring your patient, Erica Singleton, to the [facility]. Please see a copy of my visit note below.    Oncology Rooming Note    June 28, 2022 12:52 PM   Erica Singleton is a 37 year old female who presents for:    Chief Complaint   Patient presents with     Urology     Initial Vitals: BP (!) 156/95   Pulse 70   Temp 98.5  F (36.9  C)   Resp 18   Ht 1.6 m (5' 3\")   Wt 75 kg (165 lb 4.8 oz)   SpO2 99%   BMI 29.28 kg/m   Estimated body mass index is 29.28 kg/m  as calculated from the following:    Height as of this encounter: 1.6 m (5' 3\").    Weight as of this encounter: 75 kg (165 lb 4.8 oz). Body surface area is 1.83 meters squared.  Extreme Pain (8) Comment: Data Unavailable   No LMP recorded. Patient has had an implant.  Allergies reviewed: Yes  Medications reviewed: Yes    Medications: Medication refills not needed today.  Pharmacy name entered into [facility]: [id number] [address] AT [address]    Clinical concerns: None       Kari Uribe LPN                    Chief Complaint:    Renal Mass           Consult or Referral:     Mr. Erica Singleton is a 37 year old female seen at the request of Dr. Navarrete.         History of Present Illness:     Erica Singelton is a 37 year old female being seen for right renal mass.  Duration of problem: Incidentally discovered while being worked up for abdominal pain  Previous treatments: Diabetic type II     accompanied by her mother.  Reviewed previous notes from Dr. Navarrete    April was recently seen in the ED with abdominal pain upon evaluation was found to have a right renal mass which was cystic in consistency  She follow-up with an MRI which confirmed the presence of a cystic renal mass with nodular components  She has vague abdominal pain not localized " to the right flank region  There is no history of hematuria  She is a diabetic type II not on insulin             Past Medical History:   No past medical history on file.         Past Surgical History:   No past surgical history on file.         Medications     Current Outpatient Medications   Medication     acetaminophen-codeine (TYLENOL #3) 300-30 MG tablet     albuterol (PROAIR HFA/PROVENTIL HFA/VENTOLIN HFA) 108 (90 Base) MCG/ACT inhaler     blood glucose (FREESTYLE LITE) test strip     Cyanocobalamin (B-12) 1000 MCG TBCR     ketorolac (TORADOL) 10 MG tablet     metoclopramide (REGLAN) 5 MG tablet     oxyCODONE-acetaminophen (PERCOCET) 5-325 MG tablet     OZEMPIC, 1 MG/DOSE, 4 MG/3ML SOPN     Semaglutide, 1 MG/DOSE, (OZEMPIC, 1 MG/DOSE,) 4 MG/3ML SOPN     ondansetron (ZOFRAN ODT) 4 MG ODT tab     No current facility-administered medications for this visit.            Family History:   No family history on file.         Social History:     Social History     Socioeconomic History     Marital status:      Spouse name: Not on file     Number of children: Not on file     Years of education: Not on file     Highest education level: Not on file   Occupational History     Not on file   Tobacco Use     Smoking status: Never Smoker     Smokeless tobacco: Never Used   Substance and Sexual Activity     Alcohol use: Not Currently     Drug use: Never     Sexual activity: Not on file   Other Topics Concern     Not on file   Social History Narrative     Not on file     Social Determinants of Health     Financial Resource Strain: Not on file   Food Insecurity: Not on file   Transportation Needs: Not on file   Physical Activity: Not on file   Stress: Not on file   Social Connections: Not on file   Intimate Partner Violence: Not on file   Housing Stability: Not on file            Allergies:   Amoxicillin         Review of Systems:  From intake questionnaire     Skin: negative  Eyes: negative  Ears/Nose/Throat:  "negative  Respiratory: No shortness of breath, dyspnea on exertion, cough, or hemoptysis  Cardiovascular: No chest pain or palpitations  Gastrointestinal: negative; no nausea/vomiting, constipation or diarrhea  Genitourinary: as per HPI  Musculoskeletal: negative  Neurologic: negative  Psychiatric: negative  Hematologic/Lymphatic/Immunologic: negative  Endocrine: negative         Physical Exam:     Patient is a 37 year old  female   Vitals: Blood pressure (!) 156/95, pulse 70, temperature 98.5  F (36.9  C), resp. rate 18, height 1.6 m (5' 3\"), weight 75 kg (165 lb 4.8 oz), SpO2 99 %.  Constitutional: Body mass index is 29.28 kg/m .  Alert, no acute distress, oriented, conversant  Eyes: no scleral icterus; extraocular muscles intact, moist conjunctivae  Neck: trachea midline, no thyromegaly  Ears/nose/mouth: throat/mouth:normal, good dentition  Respiratory: no respiratory distress, or pursed lip breathing  Cardiovascular: pulses strong and intact; no obvious jugular venous distension present  Gastrointestinal: soft, nontender, no organomegaly or masses,   Lymphatics: No inguinal adenopathy  Musculoskeletal: extremities normal, no peripheral edema  Skin: no suspicious lesions or rashes  Neuro: Alert, oriented, speech and mentation normal  Psych: affect and mood normal, alert and oriented to person, place and time  Gait: Normal  : deferred      Labs and Pathology:    The following labs were reviewed by me and discussed with the patient:    Significant for   Lab Results   Component Value Date    CR 0.79 05/23/2022     No results found for: PSA          Imaging:    The following imaging exams were independently viewed and interpreted by me and discussed with patient:  I reviewed the CT abdomen pelvis and the MRI images with the patient and agree with the findings of the radiologist  CT Scan Abd/Pelvis: Abnormal:   Approximately 3 cm sized mass on the upper pole of the right kidney, confined within the kidney without " involvement of lymph nodes or renal vessels  Small cyst in the left kidney  MRI Abd/Pelvis: Abnormal:   2.8 cm mass exophytic from upper for cortex of the right kidney consistent with renal cell carcinoma    Pancreatic cyst small           Assessment and Plan:     Renal malignant tumor, right (H)  We discussed in detail about the right renal mass and its likelihood of being a malignant lesion considering the enhancement on contrast and the thick septa as seen on the MRI along with significant nodular component.  Based on these findings we discussed about possible options of management including partial nephrectomy, cryoablation, and radical nephrectomy.  I discussed the benefits and shortcomings of each of these approaches.  With respect to her mass being less than 3 cm in size there is a less likelihood of her having metastatic disease.  She had concerns regarding the cyst in the pancreas as well as in the opposite kidney which I do not think is metastatic based on my evaluation of the imaging.  We discussed specifically robot-assisted partial nephrectomy and the details of the procedure including the amount of renal parenchyma that might be removed to ensure complete resection.  We discussed that we would use sutures to repair the kidney after tumor removal along with use of hemostatic agents to ensure hemostasis.  We also discussed about expected hospital course including admission, stay, goals of care and discharge.  We also discussed about the need for a Topete catheter overnight and the possible use of a drain.  Possible complications of the procedure including but not limited to bleeding, need for blood transfusion, surrounding organ injury, DVT, ileus, possible urinary leak and delayed bleeding due to pseudoaneurysms was discussed.  We also discussed about the possibility of conversion to an open procedure and the need for radical nephrectomy.  Risks of that of 2 complications are about 5% based on the  current practice patterns.  We also discussed about our goals of follow-up including imaging surveillance following partial nephrectomy which will largely be based on the tumor corrector sticks that we will identify subsequent to that procedure.  We also discussed about the risk of recurrence with partial nephrectomy as well as that following cryoablation and how those 2 approaches are different.  All of the relevant questions were answered and discussed in detail  Based on the discussion we had she was agreeable to proceed ahead with scheduling for partial nephrectomy with a robotic approach on the right side  We will finalize the informed consent on the day of the procedure.  - Adult Urology Referral  - Case Request: NEPHRECTOMY, PARTIAL, ROBOT-ASSISTED, LAPAROSCOPIC WITH INTROPERATIVE ULTRASOUND , POSSIBLE OPEN; Standing  - Case Request: NEPHRECTOMY, PARTIAL, ROBOT-ASSISTED, LAPAROSCOPIC WITH INTROPERATIVE ULTRASOUND , POSSIBLE OPEN  - ketorolac (TORADOL) 10 MG tablet; Take 1 tablet (10 mg) by mouth every 6 hours as needed for moderate pain    Type 2 diabetes mellitus with hyperglycemia, without long-term current use of insulin (H)  Well-controlled type 2 diabetes  Rest as per primary        Plan:  Scheduled for robot-assisted partial nephrectomy    Orders  Orders Placed This Encounter   Procedures     Case Request: NEPHRECTOMY, PARTIAL, ROBOT-ASSISTED, LAPAROSCOPIC WITH INTROPERATIVE ULTRASOUND , POSSIBLE OPEN       Gordo Rosado MD  [facility]      ==========================    Additional Billing and Coding Information:  Review of external notes as documented above   Review of the result(s) of each unique test - Creatinine    Independent interpretation of a test performed by another physician/other qualified health care professional (not separately reported) -MRI abdomen, CT abdomen pelvis      Discussion of management or test interpretation with external physician/other  qualified healthcare professional/appropriate source -       Diagnosis or treatment significantly limited by social determinants of health -       42 minutes spent on the date of the encounter doing chart review, review of test results, interpretation of tests, patient visit, documentation and discussion with family     ==========================      Again, thank you for allowing me to participate in the care of your patient.        Sincerely,        Gordo Rosado MD

## 2022-07-23 ENCOUNTER — HEALTH MAINTENANCE LETTER (OUTPATIENT)
Age: 37
End: 2022-07-23

## 2022-10-01 ENCOUNTER — HEALTH MAINTENANCE LETTER (OUTPATIENT)
Age: 37
End: 2022-10-01

## 2023-02-04 ENCOUNTER — HEALTH MAINTENANCE LETTER (OUTPATIENT)
Age: 38
End: 2023-02-04

## 2023-05-13 ENCOUNTER — HEALTH MAINTENANCE LETTER (OUTPATIENT)
Age: 38
End: 2023-05-13

## 2023-08-06 ENCOUNTER — HEALTH MAINTENANCE LETTER (OUTPATIENT)
Age: 38
End: 2023-08-06

## 2023-10-15 ENCOUNTER — HEALTH MAINTENANCE LETTER (OUTPATIENT)
Age: 38
End: 2023-10-15

## 2024-03-03 ENCOUNTER — HEALTH MAINTENANCE LETTER (OUTPATIENT)
Age: 39
End: 2024-03-03

## 2024-05-12 ENCOUNTER — HEALTH MAINTENANCE LETTER (OUTPATIENT)
Age: 39
End: 2024-05-12

## 2024-07-21 ENCOUNTER — HEALTH MAINTENANCE LETTER (OUTPATIENT)
Age: 39
End: 2024-07-21

## 2024-09-28 ENCOUNTER — HEALTH MAINTENANCE LETTER (OUTPATIENT)
Age: 39
End: 2024-09-28

## 2024-12-07 ENCOUNTER — HEALTH MAINTENANCE LETTER (OUTPATIENT)
Age: 39
End: 2024-12-07

## 2025-03-15 ENCOUNTER — HEALTH MAINTENANCE LETTER (OUTPATIENT)
Age: 40
End: 2025-03-15

## 2025-06-29 ENCOUNTER — HEALTH MAINTENANCE LETTER (OUTPATIENT)
Age: 40
End: 2025-06-29